# Patient Record
Sex: FEMALE | Race: WHITE | NOT HISPANIC OR LATINO | Employment: OTHER
[De-identification: names, ages, dates, MRNs, and addresses within clinical notes are randomized per-mention and may not be internally consistent; named-entity substitution may affect disease eponyms.]

---

## 2018-07-17 ENCOUNTER — TRANSCRIBE ORDERS (OUTPATIENT)
Dept: SCHEDULING | Age: 58
End: 2018-07-17

## 2018-07-17 DIAGNOSIS — Z12.31 ENCOUNTER FOR SCREENING MAMMOGRAM FOR MALIGNANT NEOPLASM OF BREAST: Primary | ICD-10-CM

## 2018-07-19 ENCOUNTER — HOSPITAL ENCOUNTER (OUTPATIENT)
Dept: RADIOLOGY | Facility: HOSPITAL | Age: 58
Discharge: HOME | End: 2018-07-19
Attending: OBSTETRICS & GYNECOLOGY
Payer: COMMERCIAL

## 2018-07-19 DIAGNOSIS — Z12.31 ENCOUNTER FOR SCREENING MAMMOGRAM FOR MALIGNANT NEOPLASM OF BREAST: ICD-10-CM

## 2018-07-19 PROCEDURE — 77063 BREAST TOMOSYNTHESIS BI: CPT

## 2019-07-18 ENCOUNTER — TRANSCRIBE ORDERS (OUTPATIENT)
Dept: SCHEDULING | Age: 59
End: 2019-07-18

## 2019-07-18 DIAGNOSIS — Z12.31 ENCOUNTER FOR SCREENING MAMMOGRAM FOR MALIGNANT NEOPLASM OF BREAST: Primary | ICD-10-CM

## 2019-07-22 ENCOUNTER — HOSPITAL ENCOUNTER (OUTPATIENT)
Dept: RADIOLOGY | Facility: HOSPITAL | Age: 59
Discharge: HOME | End: 2019-07-22
Attending: OBSTETRICS & GYNECOLOGY
Payer: COMMERCIAL

## 2019-07-22 DIAGNOSIS — Z12.31 ENCOUNTER FOR SCREENING MAMMOGRAM FOR MALIGNANT NEOPLASM OF BREAST: ICD-10-CM

## 2019-07-22 PROCEDURE — 77067 SCR MAMMO BI INCL CAD: CPT

## 2020-03-03 ENCOUNTER — TRANSCRIBE ORDERS (OUTPATIENT)
Dept: SCHEDULING | Age: 60
End: 2020-03-03

## 2020-03-03 DIAGNOSIS — Z13.820 ENCOUNTER FOR SCREENING FOR OSTEOPOROSIS: Primary | ICD-10-CM

## 2020-03-05 ENCOUNTER — HOSPITAL ENCOUNTER (OUTPATIENT)
Dept: RADIOLOGY | Facility: CLINIC | Age: 60
Discharge: HOME | End: 2020-03-05
Attending: FAMILY MEDICINE
Payer: COMMERCIAL

## 2020-03-05 DIAGNOSIS — Z13.820 ENCOUNTER FOR SCREENING FOR OSTEOPOROSIS: ICD-10-CM

## 2020-03-05 PROCEDURE — 77080 DXA BONE DENSITY AXIAL: CPT

## 2020-08-31 ENCOUNTER — TRANSCRIBE ORDERS (OUTPATIENT)
Dept: SCHEDULING | Age: 60
End: 2020-08-31

## 2020-08-31 DIAGNOSIS — Z12.31 ENCOUNTER FOR SCREENING MAMMOGRAM FOR MALIGNANT NEOPLASM OF BREAST: Primary | ICD-10-CM

## 2020-08-31 DIAGNOSIS — Z12.39 ENCOUNTER FOR OTHER SCREENING FOR MALIGNANT NEOPLASM OF BREAST: ICD-10-CM

## 2020-09-01 ENCOUNTER — HOSPITAL ENCOUNTER (OUTPATIENT)
Dept: RADIOLOGY | Facility: HOSPITAL | Age: 60
Discharge: HOME | End: 2020-09-01
Attending: OBSTETRICS & GYNECOLOGY
Payer: COMMERCIAL

## 2020-09-01 DIAGNOSIS — Z12.31 ENCOUNTER FOR SCREENING MAMMOGRAM FOR MALIGNANT NEOPLASM OF BREAST: ICD-10-CM

## 2020-09-01 DIAGNOSIS — Z12.39 ENCOUNTER FOR OTHER SCREENING FOR MALIGNANT NEOPLASM OF BREAST: ICD-10-CM

## 2020-09-01 PROCEDURE — 77063 BREAST TOMOSYNTHESIS BI: CPT

## 2020-10-22 ENCOUNTER — TRANSCRIBE ORDERS (OUTPATIENT)
Dept: SCHEDULING | Age: 60
End: 2020-10-22

## 2020-10-22 DIAGNOSIS — R92.2 INCONCLUSIVE MAMMOGRAM: Primary | ICD-10-CM

## 2020-10-26 ENCOUNTER — HOSPITAL ENCOUNTER (OUTPATIENT)
Dept: RADIOLOGY | Facility: HOSPITAL | Age: 60
Discharge: HOME | End: 2020-10-26
Attending: OBSTETRICS & GYNECOLOGY
Payer: COMMERCIAL

## 2020-10-26 VITALS — WEIGHT: 120 LBS

## 2020-10-26 DIAGNOSIS — R92.2 INCONCLUSIVE MAMMOGRAM: ICD-10-CM

## 2020-10-26 PROCEDURE — 77049 MRI BREAST C-+ W/CAD BI: CPT

## 2020-10-26 PROCEDURE — A9585 GADOBUTROL INJECTION: HCPCS

## 2020-10-26 RX ORDER — GADOBUTROL 604.72 MG/ML
0.1 INJECTION INTRAVENOUS ONCE
Status: COMPLETED | OUTPATIENT
Start: 2020-10-26 | End: 2020-10-26

## 2020-10-26 RX ADMIN — GADOBUTROL 5.4 MMOL: 604.72 INJECTION INTRAVENOUS at 19:00

## 2021-02-12 ENCOUNTER — TRANSCRIBE ORDERS (OUTPATIENT)
Dept: SCHEDULING | Age: 61
End: 2021-02-12

## 2021-02-12 DIAGNOSIS — M20.41 OTHER HAMMER TOE(S) (ACQUIRED), RIGHT FOOT: Primary | ICD-10-CM

## 2021-02-12 DIAGNOSIS — M77.41 METATARSALGIA, RIGHT FOOT: ICD-10-CM

## 2021-02-13 ENCOUNTER — HOSPITAL ENCOUNTER (OUTPATIENT)
Dept: RADIOLOGY | Facility: HOSPITAL | Age: 61
Discharge: HOME | End: 2021-02-13
Attending: ORTHOPAEDIC SURGERY
Payer: COMMERCIAL

## 2021-02-13 DIAGNOSIS — M20.41 OTHER HAMMER TOE(S) (ACQUIRED), RIGHT FOOT: ICD-10-CM

## 2021-02-13 DIAGNOSIS — M77.41 METATARSALGIA, RIGHT FOOT: ICD-10-CM

## 2021-09-14 ENCOUNTER — TRANSCRIBE ORDERS (OUTPATIENT)
Dept: RADIOLOGY | Facility: HOSPITAL | Age: 61
End: 2021-09-14
Payer: COMMERCIAL

## 2021-09-14 ENCOUNTER — HOSPITAL ENCOUNTER (OUTPATIENT)
Dept: RADIOLOGY | Facility: HOSPITAL | Age: 61
Discharge: HOME | End: 2021-09-14
Attending: FAMILY MEDICINE
Payer: COMMERCIAL

## 2021-09-14 DIAGNOSIS — Z12.31 ENCOUNTER FOR SCREENING MAMMOGRAM FOR MALIGNANT NEOPLASM OF BREAST: ICD-10-CM

## 2021-09-14 DIAGNOSIS — Z12.31 ENCOUNTER FOR SCREENING MAMMOGRAM FOR MALIGNANT NEOPLASM OF BREAST: Primary | ICD-10-CM

## 2021-09-14 PROCEDURE — 77067 SCR MAMMO BI INCL CAD: CPT

## 2022-06-29 ENCOUNTER — HOSPITAL ENCOUNTER (EMERGENCY)
Facility: HOSPITAL | Age: 62
Discharge: HOME | End: 2022-06-29
Attending: EMERGENCY MEDICINE
Payer: COMMERCIAL

## 2022-06-29 VITALS
OXYGEN SATURATION: 100 % | RESPIRATION RATE: 18 BRPM | BODY MASS INDEX: 22.5 KG/M2 | HEIGHT: 62 IN | HEART RATE: 54 BPM | DIASTOLIC BLOOD PRESSURE: 77 MMHG | TEMPERATURE: 97.6 F | WEIGHT: 122.3 LBS | SYSTOLIC BLOOD PRESSURE: 144 MMHG

## 2022-06-29 DIAGNOSIS — T63.441A BEE STING, ACCIDENTAL OR UNINTENTIONAL, INITIAL ENCOUNTER: Primary | ICD-10-CM

## 2022-06-29 PROCEDURE — 63700000 HC SELF-ADMINISTRABLE DRUG: Performed by: PHYSICIAN ASSISTANT

## 2022-06-29 PROCEDURE — 99283 EMERGENCY DEPT VISIT LOW MDM: CPT

## 2022-06-29 RX ORDER — FAMOTIDINE 20 MG/1
20 TABLET, FILM COATED ORAL ONCE
Status: COMPLETED | OUTPATIENT
Start: 2022-06-29 | End: 2022-06-29

## 2022-06-29 RX ORDER — EPINEPHRINE 0.3 MG/.3ML
1 INJECTION SUBCUTANEOUS AS NEEDED
Qty: 1 EACH | Refills: 0 | Status: SHIPPED | OUTPATIENT
Start: 2022-06-29 | End: 2022-07-29

## 2022-06-29 RX ADMIN — FAMOTIDINE 20 MG: 20 TABLET, FILM COATED ORAL at 09:40

## 2022-06-29 ASSESSMENT — ENCOUNTER SYMPTOMS
FACIAL SWELLING: 1
ARTHRALGIAS: 0
NAUSEA: 0
PALPITATIONS: 0
CHEST TIGHTNESS: 0
STRIDOR: 0
COUGH: 0
COLOR CHANGE: 0
SORE THROAT: 0
DIARRHEA: 0
TROUBLE SWALLOWING: 0
NUMBNESS: 0
VOMITING: 0
DIZZINESS: 0
ABDOMINAL PAIN: 0
WHEEZING: 0
EYE PAIN: 0
BACK PAIN: 0
SHORTNESS OF BREATH: 0
FEVER: 0
CHILLS: 0

## 2022-06-29 NOTE — ED PROVIDER NOTES
Emergency Medicine Note  HPI   HISTORY OF PRESENT ILLNESS     60 y/o female with no significant PMHx presents s/p bee sting around 8am this morning. States she was stung 2x on her L leg. States she was last stung about 10 years ago and has a known allergy where her face swells up. States she took 2 benadryl at home PTA. She denies tongue/lip/throat edema, SOB/difficulty breathing, hives, nausea, vomiting, diarrhea. States upon arrival to the ED she had some mild swelling to her R eye. States her epi-pens at home are .            Patient History   PAST HISTORY     Reviewed from Nursing Triage:         No past medical history on file.    No past surgical history on file.    No family history on file.           Review of Systems   REVIEW OF SYSTEMS     Review of Systems   Constitutional: Negative for chills and fever.   HENT: Positive for facial swelling. Negative for ear pain, sore throat and trouble swallowing.    Eyes: Negative for pain and visual disturbance.   Respiratory: Negative for cough, chest tightness, shortness of breath, wheezing and stridor.    Cardiovascular: Negative for chest pain and palpitations.   Gastrointestinal: Negative for abdominal pain, diarrhea, nausea and vomiting.   Musculoskeletal: Negative for arthralgias and back pain.   Skin: Negative for color change and rash.   Neurological: Negative for dizziness, syncope and numbness.   All other systems reviewed and are negative.        VITALS     ED Vitals    Date/Time Temp Pulse Resp BP SpO2 [facility]   22 0906 36.4 °C (97.6 °F) 74 20 146/65 100 % KD        Pulse Ox %: 100 % (22 1114)  Pulse Ox Interpretation: Normal (22 1114)           Physical Exam   PHYSICAL EXAM     Physical Exam  Vitals and nursing note reviewed.   Constitutional:       General: She is not in acute distress.     Appearance: She is well-developed.   HENT:      Head: Normocephalic and atraumatic.      Mouth/Throat:      Comments: No oropharyngeal  edema.  Eyes:      Conjunctiva/sclera: Conjunctivae normal.      Comments: Very minimal edema below R eye. No erythema. No other facial edema.   Cardiovascular:      Rate and Rhythm: Normal rate and regular rhythm.      Heart sounds: No murmur heard.  Pulmonary:      Effort: Pulmonary effort is normal. No respiratory distress.      Breath sounds: Normal breath sounds.   Abdominal:      Palpations: Abdomen is soft.      Tenderness: There is no abdominal tenderness.   Musculoskeletal:         General: No swelling.      Cervical back: Neck supple.   Skin:     General: Skin is warm and dry.      Findings: No lesion or rash.   Neurological:      Mental Status: She is alert.           PROCEDURES     Procedures     DATA     Results     None          Imaging Results    None         No orders to display       Scoring tools                                 ED Course & MDM   MDM / ED COURSE / CLINICAL IMPRESSIONS / DISPO     MDM  Number of Diagnoses or Management Options  Diagnosis management comments: A/P: s/p bee sting. Took 2 benadryl PTA. Given pepcid in ED and monitored. Discussed option of steroids and patient declined, opted for monitoring in ED. Epi-pens sent to pharmacy. Return precautions discussed.      ED Course as of 06/29/22 1118 Wed Jun 29, 2022   1113 Patient feels well, no swelling on exam, no respiratory distress. Return precautions discussed. [CP]      ED Course User Index  [CP] Laurie Griffin PA C         Clinical Impressions as of 06/29/22 1118   Bee sting, accidental or unintentional, initial encounter     Discharge         Laurie Griffin PA C  06/29/22 1118

## 2022-06-29 NOTE — ED ATTESTATION NOTE
The patient was evaluated and managed by the physician assistant / nurse practitioner.     Christopher Hartman MD  06/29/22 8025

## 2022-06-29 NOTE — ED ATTESTATION NOTE
The patient was evaluated and managed by the physician assistant / nurse practitioner.     Christopher Hartman MD  07/05/22 9746

## 2022-09-20 NOTE — PROGRESS NOTES
Cardiology  Office Consult Note         Reason for visit:   Chief Complaint   Patient presents with    Cardiac Evaulation     New Pt       HPI     Soraya Carmen is a 62 y.o. female who presents to the office for cardiovascular evaluation. She was referred to the office by PCP for palpitations. EKG at this visit was SR without significant abnormalities.     She has a PMH of elevated blood pressure without diagnosis of hypertension, mixed hyperlipidemia, and factor V Leiden mutation. She has a family history of HTN, HLD in her mother and HTN, HLD, stroke and factor V in her father.     Labs from [facility] 3/4/22 FLP: , TG 44, , .    Today she reports having palpitations several times a week. 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 She occasional has these palpations at night when trying to go to sleep. They last a few seconds and then resolve.     She notes one episode of lightheadedness but relates this to being dehydration and the humidity. She has not had any recurrent episodes since August. She reports that she walks/runs or riding her stationary bike 3-4 miles a couple times a week. She also goes to yoga and aerobics class 2 times a week. She denies symptoms on exertion. She denies chest pain, edema, shortness of breath, dizziness, edema or near syncopal episodes.     Outside records reviewed. Pertinent lab results reviewed..    Past Medical History:   Diagnosis Date    Adjustment insomnia     Anxiety in acute stress reaction     COVID-19     DDD (degenerative disc disease), lumbosacral     Elevated blood pressure reading without diagnosis of hypertension     Factor V Leiden mutation (CMS/[facility])     Female stress incontinence     Hypercholesterolemia     Hyperglycemia     Intermittent palpitations     Mixed hyperlipidemia     Chen's neuroma     Nephrolithiasis     Vitamin D deficiency        Past Surgical History:    Procedure Laterality Date    BUNIONECTOMY       SECTION      x2        Social History     Tobacco Use   Smoking Status Never Smoker   Smokeless Tobacco Never Used     Social History     Tobacco Use    Smoking status: Never Smoker    Smokeless tobacco: Never Used   Vaping Use    Vaping Use: Never used   Substance Use Topics    Alcohol use: Yes     Comment: rarely    Drug use: Never       Family History   Problem Relation Age of Onset    Multiple myeloma Biological Mother     Diabetes Biological Mother     Hypertension Biological Mother     Hyperlipidemia Biological Mother     Pancreatic cancer Biological Father     Hypertension Biological Father     Factor V Leiden deficiency Biological Father     Anxiety disorder Biological Sister     Alcohol abuse Biological Brother     Heart disease Biological Brother     Hyperlipidemia Biological Brother     Drug abuse Biological Brother        Allergies:  Bee sting [bee venom protein (honey bee)]    Current Outpatient Medications   Medication Sig Dispense Refill    cholecalciferol, vitamin D3, 25 mcg (1,000 unit) capsule Take by mouth.      estradioL (ESTRACE) 0.01 % (0.1 mg/gram) vaginal cream APPLY 1 G INTO THE VAGINA 2 TIMES A WEEK.      multivitamin tablet Take by mouth daily.      omega-3s/dha/epa/fish oil/D3 (DE3 DRY EYE OMEGA BENEFITS ORAL) Take by mouth daily.      turmeric root extract 500 mg capsule Take 1 tablet by mouth daily.      zinc sulfate (ZINCATE) 220 (50) mg capsule Take 220 mg by mouth daily.       No current facility-administered medications for this visit.       Review of Systems   Cardiovascular: Positive for palpitations. Negative for chest pain, dyspnea on exertion, leg swelling, near-syncope, orthopnea, paroxysmal nocturnal dyspnea and syncope.   Hematologic/Lymphatic: Does not bruise/bleed easily.   Neurological: Positive for light-headedness. Negative for dizziness.       Objective     Vitals:    22 1318   BP:  130/82   Pulse:    SpO2:        Wt Readings from Last 1 Encounters:   09/26/22 55.3 kg (122 lb)       Body mass index is 22.31 kg/m².    Physical Exam  Vitals reviewed.   Constitutional:       Appearance: Normal appearance.   Cardiovascular:      Rate and Rhythm: Normal rate and regular rhythm.      Pulses: Normal pulses.      Heart sounds: Normal heart sounds.   Pulmonary:      Effort: Pulmonary effort is normal.      Breath sounds: Normal breath sounds.   Musculoskeletal:         General: Normal range of motion.      Cervical back: Normal range of motion.   Skin:     General: Skin is warm and dry.   Neurological:      General: No focal deficit present.      Mental Status: She is alert and oriented to person, place, and time.           ECG: Normal sinus rhythm, essentially normal ECG      Assessment/Plan     Intermittent palpitations  The patient has been having palpitations which appear to be somewhat sporadic and increasing in frequency.  I have asked the patient to wear an event monitor so that we may characterize her rhythm status.  She is agreed and she will return to the office in 2 to 3 weeks at which time we can review the results of her monitor and perform an echocardiogram.  For now, I made no changes to her medical regimen.    Mixed hyperlipidemia  The patient's lipid profile shows a mild/moderate abnormality.  When she returns to the office I will probably discuss coronary calcium scoring with her.  For now, I am focused on her heart rhythm issue.    Elevated BP without diagnosis of hypertension  We will monitor her blood pressure during her cardiac evaluation and can make recommendations at the time of her follow-up visits.    Factor V Leiden mutation (CMS/HCC)  The patient is heterozygous for factor V Leiden gene mutation.  She has never had a clinical event to correlate.    New Medications Ordered This Visit   Medications    estradioL (ESTRACE) 0.01 % (0.1 mg/gram) vaginal cream     Sig: APPLY 1 G  INTO THE VAGINA 2 TIMES A WEEK.       There are no discontinued medications.    Orders Placed This Encounter   Procedures    Mobile cardiac outpatient telemetry     Order Specific Question:   Length of monitoring (days)     Answer:   7     Order Specific Question:   Release to patient     Answer:   Immediate    ECG 12 LEAD-OFFICE PERFORMED     Scheduling Instructions:      PLEASE USE THIS ORDER FOR ECG'S PERFORMED IN PHYSICIAN OFFICES     Order Specific Question:   Release to patient     Answer:   Immediate    Transthoracic echo (TTE) complete     Standing Status:   Future     Standing Expiration Date:   9/26/2023     Scheduling Instructions:      To schedule cardiology appointments with [facility], call Central Scheduling at [phone]. Thank you.     Order Specific Question:   Is Definity and/or agitated saline (bubbles) indicated for the study?     Answer:   No     Order Specific Question:   Release to patient     Answer:   Immediate       Follow Up Plans:  Return in about 2 weeks (around 10/10/2022) for Recheck.          Flower MARCUS, am scribing for, and in the presence of, Danny Hoskins MD.    IDanny MD, personally performed the services described in this documentation as scribed by Flower Hall in my presence, and it is both accurate and complete.     Danny Hoskins MD  9/26/2022

## 2022-09-22 PROBLEM — R00.2 INTERMITTENT PALPITATIONS: Status: ACTIVE | Noted: 2022-09-22

## 2022-09-22 PROBLEM — D68.51 FACTOR V LEIDEN MUTATION (CMS/HCC): Status: ACTIVE | Noted: 2022-09-22

## 2022-09-22 PROBLEM — E78.2 MIXED HYPERLIPIDEMIA: Status: ACTIVE | Noted: 2022-09-22

## 2022-09-22 PROBLEM — E78.00 HYPERCHOLESTEROLEMIA: Status: ACTIVE | Noted: 2022-09-22

## 2022-09-26 ENCOUNTER — OFFICE VISIT (OUTPATIENT)
Dept: CARDIOLOGY | Facility: CLINIC | Age: 62
End: 2022-09-26
Payer: COMMERCIAL

## 2022-09-26 ENCOUNTER — TELEPHONE (OUTPATIENT)
Dept: CARDIOLOGY | Facility: CLINIC | Age: 62
End: 2022-09-26

## 2022-09-26 VITALS
HEART RATE: 66 BPM | OXYGEN SATURATION: 98 % | SYSTOLIC BLOOD PRESSURE: 130 MMHG | BODY MASS INDEX: 22.45 KG/M2 | DIASTOLIC BLOOD PRESSURE: 82 MMHG | HEIGHT: 62 IN | WEIGHT: 122 LBS

## 2022-09-26 DIAGNOSIS — R03.0 ELEVATED BP WITHOUT DIAGNOSIS OF HYPERTENSION: ICD-10-CM

## 2022-09-26 DIAGNOSIS — R00.0 TACHYCARDIA: Primary | ICD-10-CM

## 2022-09-26 DIAGNOSIS — E78.2 MIXED HYPERLIPIDEMIA: ICD-10-CM

## 2022-09-26 DIAGNOSIS — D68.51 FACTOR V LEIDEN MUTATION (CMS/HCC): ICD-10-CM

## 2022-09-26 DIAGNOSIS — R00.2 INTERMITTENT PALPITATIONS: ICD-10-CM

## 2022-09-26 PROCEDURE — 3008F BODY MASS INDEX DOCD: CPT | Performed by: INTERNAL MEDICINE

## 2022-09-26 PROCEDURE — 93000 ELECTROCARDIOGRAM COMPLETE: CPT | Performed by: INTERNAL MEDICINE

## 2022-09-26 PROCEDURE — 99204 OFFICE O/P NEW MOD 45 MIN: CPT | Performed by: INTERNAL MEDICINE

## 2022-09-26 RX ORDER — ESTRADIOL 0.1 MG/G
CREAM VAGINAL
COMMUNITY
Start: 2022-09-07

## 2022-09-26 ASSESSMENT — ENCOUNTER SYMPTOMS
PND: 0
NEAR-SYNCOPE: 0
PALPITATIONS: 1
DIZZINESS: 0
ORTHOPNEA: 0
SYNCOPE: 0
BRUISES/BLEEDS EASILY: 0
LIGHT-HEADEDNESS: 1
DYSPNEA ON EXERTION: 0

## 2022-09-26 NOTE — LETTER
September 26, 2022     Zoe Fernandez DO  [address] [facility] [address]    Patient: Soraya Carmen  YOB: 1960  Date of Visit: 9/26/2022      Dear Dr. Fernandez:    Thank you for referring Soraya Carmen to me for evaluation. Below are my notes for this consultation.    If you have questions, please do not hesitate to call me. I look forward to following your patient along with you.         Sincerely,        Danny Hoskins MD        CC: No Recipients  Danny Hoskins MD  9/26/2022  2:02 PM  Signed       Cardiology  Office Consult Note         Reason for visit:   Chief Complaint   Patient presents with    Cardiac Evaulation     New Pt       HPI     Soraya Carmen is a 62 y.o. female who presents to the office for cardiovascular evaluation. She was referred to the office by PCP for palpitations. EKG at this visit was SR without significant abnormalities.     She has a PMH of elevated blood pressure without diagnosis of hypertension, mixed hyperlipidemia, and factor V Leiden mutation. She has a family history of HTN, HLD in her mother and HTN, HLD, stroke and factor V in her father.     Labs from [facility] 3/4/22 FLP: , TG 44, , .    Today she reports having palpitations several times a week. 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 She occasional has these palpations at night when trying to go to sleep. They last a few seconds and then resolve.     She notes one episode of lightheadedness but relates this to being dehydration and the humidity. She has not had any recurrent episodes since August. She reports that she walks/runs or riding her stationary bike 3-4 miles a couple times a week. She also goes to yoga and aerobics class 2 times a week. She denies symptoms on exertion. She denies chest pain, edema, shortness of breath, dizziness, edema or near syncopal episodes.     Outside records  reviewed. Pertinent lab results reviewed..    Past Medical History:   Diagnosis Date    Adjustment insomnia     Anxiety in acute stress reaction     COVID-19     DDD (degenerative disc disease), lumbosacral     Elevated blood pressure reading without diagnosis of hypertension     Factor V Leiden mutation (CMS/HCC)     Female stress incontinence     Hypercholesterolemia     Hyperglycemia     Intermittent palpitations     Mixed hyperlipidemia     Chen's neuroma     Nephrolithiasis     Vitamin D deficiency        Past Surgical History:   Procedure Laterality Date    BUNIONECTOMY       SECTION      x2        Social History     Tobacco Use   Smoking Status Never Smoker   Smokeless Tobacco Never Used     Social History     Tobacco Use    Smoking status: Never Smoker    Smokeless tobacco: Never Used   Vaping Use    Vaping Use: Never used   Substance Use Topics    Alcohol use: Yes     Comment: rarely    Drug use: Never       Family History   Problem Relation Age of Onset    Multiple myeloma Biological Mother     Diabetes Biological Mother     Hypertension Biological Mother     Hyperlipidemia Biological Mother     Pancreatic cancer Biological Father     Hypertension Biological Father     Factor V Leiden deficiency Biological Father     Anxiety disorder Biological Sister     Alcohol abuse Biological Brother     Heart disease Biological Brother     Hyperlipidemia Biological Brother     Drug abuse Biological Brother        Allergies:  Bee sting [bee venom protein (honey bee)]    Current Outpatient Medications   Medication Sig Dispense Refill    cholecalciferol, vitamin D3, 25 mcg (1,000 unit) capsule Take by mouth.      estradioL (ESTRACE) 0.01 % (0.1 mg/gram) vaginal cream APPLY 1 G INTO THE VAGINA 2 TIMES A WEEK.      multivitamin tablet Take by mouth daily.      omega-3s/dha/epa/fish oil/D3 (DE3 DRY EYE OMEGA BENEFITS ORAL) Take by mouth daily.      turmeric root extract 500 mg  capsule Take 1 tablet by mouth daily.      zinc sulfate (ZINCATE) 220 (50) mg capsule Take 220 mg by mouth daily.       No current facility-administered medications for this visit.       Review of Systems   Cardiovascular: Positive for palpitations. Negative for chest pain, dyspnea on exertion, leg swelling, near-syncope, orthopnea, paroxysmal nocturnal dyspnea and syncope.   Hematologic/Lymphatic: Does not bruise/bleed easily.   Neurological: Positive for light-headedness. Negative for dizziness.       Objective     Vitals:    09/26/22 1318   BP: 130/82   Pulse:    SpO2:        Wt Readings from Last 1 Encounters:   09/26/22 55.3 kg (122 lb)       Body mass index is 22.31 kg/m².    Physical Exam  Vitals reviewed.   Constitutional:       Appearance: Normal appearance.   Cardiovascular:      Rate and Rhythm: Normal rate and regular rhythm.      Pulses: Normal pulses.      Heart sounds: Normal heart sounds.   Pulmonary:      Effort: Pulmonary effort is normal.      Breath sounds: Normal breath sounds.   Musculoskeletal:         General: Normal range of motion.      Cervical back: Normal range of motion.   Skin:     General: Skin is warm and dry.   Neurological:      General: No focal deficit present.      Mental Status: She is alert and oriented to person, place, and time.           ECG: Normal sinus rhythm, essentially normal ECG      Assessment/Plan     Intermittent palpitations  The patient has been having palpitations which appear to be somewhat sporadic and increasing in frequency.  I have asked the patient to wear an event monitor so that we may characterize her rhythm status.  She is agreed and she will return to the office in 2 to 3 weeks at which time we can review the results of her monitor and perform an echocardiogram.  For now, I made no changes to her medical regimen.    Mixed hyperlipidemia  The patient's lipid profile shows a mild/moderate abnormality.  When she returns to the office I will probably  discuss coronary calcium scoring with her.  For now, I am focused on her heart rhythm issue.    Elevated BP without diagnosis of hypertension  We will monitor her blood pressure during her cardiac evaluation and can make recommendations at the time of her follow-up visits.    Factor V Leiden mutation (CMS/HCC)  The patient is heterozygous for factor V Leiden gene mutation.  She has never had a clinical event to correlate.    New Medications Ordered This Visit   Medications    estradioL (ESTRACE) 0.01 % (0.1 mg/gram) vaginal cream     Sig: APPLY 1 G INTO THE VAGINA 2 TIMES A WEEK.       There are no discontinued medications.    Orders Placed This Encounter   Procedures    Mobile cardiac outpatient telemetry     Order Specific Question:   Length of monitoring (days)     Answer:   7     Order Specific Question:   Release to patient     Answer:   Immediate    ECG 12 LEAD-OFFICE PERFORMED     Scheduling Instructions:      PLEASE USE THIS ORDER FOR ECG'S PERFORMED IN PHYSICIAN OFFICES     Order Specific Question:   Release to patient     Answer:   Immediate    Transthoracic echo (TTE) complete     Standing Status:   Future     Standing Expiration Date:   9/26/2023     Scheduling Instructions:      To schedule cardiology appointments with [facility], call Central Scheduling at [phone]. Thank you.     Order Specific Question:   Is Definity and/or agitated saline (bubbles) indicated for the study?     Answer:   No     Order Specific Question:   Release to patient     Answer:   Immediate       Follow Up Plans:  Return in about 2 weeks (around 10/10/2022) for Recheck.          IFlower, am scribing for, and in the presence of, Danny Hoskins MD.    Danny MARCUS MD, personally performed the services described in this documentation as scribed by Flower Hall in my presence, and it is both accurate and complete.     Danny Hoskins MD  9/26/2022

## 2022-09-26 NOTE — ASSESSMENT & PLAN NOTE
The patient is heterozygous for factor V Leiden gene mutation.  She has never had a clinical event to correlate.   no 97.8

## 2022-09-26 NOTE — ASSESSMENT & PLAN NOTE
We will monitor her blood pressure during her cardiac evaluation and can make recommendations at the time of her follow-up visits.

## 2022-09-26 NOTE — TELEPHONE ENCOUNTER
The patient is being ordered a Transthoracic echo (TTE) complete to be done at the [facility] office on 10/12/2022.  She has Personal choice for insurance.  Can you please try and get this approved?  Thank you      [facility] PERSONAL CHOICE  [id number] [facility]       Primary Visit Coverage Subscriber    ID Name SSN Address   [id number]

## 2022-09-26 NOTE — ASSESSMENT & PLAN NOTE
The patient's lipid profile shows a mild/moderate abnormality.  When she returns to the office I will probably discuss coronary calcium scoring with her.  For now, I am focused on her heart rhythm issue.

## 2022-10-04 ENCOUNTER — TRANSCRIBE ORDERS (OUTPATIENT)
Dept: REGISTRATION | Age: 62
End: 2022-10-04

## 2022-10-04 ENCOUNTER — HOSPITAL ENCOUNTER (OUTPATIENT)
Dept: RADIOLOGY | Age: 62
Discharge: HOME | End: 2022-10-04
Attending: FAMILY MEDICINE
Payer: COMMERCIAL

## 2022-10-04 DIAGNOSIS — Z12.31 ENCOUNTER FOR SCREENING MAMMOGRAM FOR MALIGNANT NEOPLASM OF BREAST: ICD-10-CM

## 2022-10-04 DIAGNOSIS — Z12.31 ENCOUNTER FOR SCREENING MAMMOGRAM FOR MALIGNANT NEOPLASM OF BREAST: Primary | ICD-10-CM

## 2022-10-04 PROCEDURE — 77063 BREAST TOMOSYNTHESIS BI: CPT

## 2022-10-10 ENCOUNTER — TELEPHONE (OUTPATIENT)
Dept: CARDIOLOGY | Facility: CLINIC | Age: 62
End: 2022-10-10
Payer: COMMERCIAL

## 2022-10-10 NOTE — TELEPHONE ENCOUNTER
Patient stated that she does not want to wear the MCOT she wants to mail it back today. She stated that it is to big and bulky for her and she is to active to wear it     She stated that she wants to wear the patch and would like to know where she can get one    Please advise

## 2022-10-11 NOTE — TELEPHONE ENCOUNTER
Patient stated that she will see you on Wednesday for her appt and will discuss her options with you then

## 2022-10-12 ENCOUNTER — HOSPITAL ENCOUNTER (OUTPATIENT)
Dept: CARDIOLOGY | Facility: CLINIC | Age: 62
Discharge: HOME | End: 2022-10-12
Attending: INTERNAL MEDICINE
Payer: COMMERCIAL

## 2022-10-12 ENCOUNTER — OFFICE VISIT (OUTPATIENT)
Dept: CARDIOLOGY | Facility: CLINIC | Age: 62
End: 2022-10-12
Payer: COMMERCIAL

## 2022-10-12 VITALS
HEIGHT: 62 IN | WEIGHT: 122 LBS | DIASTOLIC BLOOD PRESSURE: 82 MMHG | SYSTOLIC BLOOD PRESSURE: 130 MMHG | BODY MASS INDEX: 22.45 KG/M2

## 2022-10-12 VITALS
WEIGHT: 124 LBS | HEART RATE: 70 BPM | OXYGEN SATURATION: 98 % | HEIGHT: 62 IN | BODY MASS INDEX: 22.82 KG/M2 | DIASTOLIC BLOOD PRESSURE: 80 MMHG | SYSTOLIC BLOOD PRESSURE: 112 MMHG

## 2022-10-12 DIAGNOSIS — R03.0 ELEVATED BP WITHOUT DIAGNOSIS OF HYPERTENSION: ICD-10-CM

## 2022-10-12 DIAGNOSIS — D68.51 FACTOR V LEIDEN MUTATION (CMS/HCC): ICD-10-CM

## 2022-10-12 DIAGNOSIS — E78.2 MIXED HYPERLIPIDEMIA: ICD-10-CM

## 2022-10-12 DIAGNOSIS — R00.2 INTERMITTENT PALPITATIONS: ICD-10-CM

## 2022-10-12 DIAGNOSIS — R00.2 INTERMITTENT PALPITATIONS: Primary | ICD-10-CM

## 2022-10-12 LAB
AORTIC ROOT ANNULUS: 2.5 CM
AORTIC VALVE MEAN VELOCITY: 1.1 M/S
AORTIC VALVE VELOCITY TIME INTEGRAL: 32.2 CM
ASCENDING AORTA: 2.7 CM
AV MEAN GRADIENT: 5 MMHG
AV PEAK GRADIENT: 10 MMHG
AV PEAK VELOCITY-S: 1.58 M/S
AV VALVE AREA: 2.03 CM2
BSA FOR ECHO PROCEDURE: 1.56 M2
CUSP SEPARATION: 2.2 CM
DOP CALC LVOT STROKE VOLUME: 65.37 ML
E WAVE DECELERATION TIME: 204 MS
E/A RATIO: 0.7
E/E' RATIO: 10
E/LAT E' RATIO: 7.1
EDV (BP): 56.3 CM3
EF (A4C): 65.7 %
EF A2C: 61.6 %
EJECTION FRACTION: 63.1 %
EST RIGHT VENT SYSTOLIC PRESSURE BY TRICUSPID REGURGITATION JET: 16 MMHG
ESV (BP): 20.8 CM3
FRACTIONAL SHORTENING: 33.3 %
INTERVENTRICULAR SEPTUM: 0.9 CM
LA ESV (BP): 44.6 CM3
LA ESV INDEX (A2C): 22.5 CM3/M2
LA ESV INDEX (BP): 28.59 CM3/M2
LA/AORTA RATIO: 1.2
LAAS-AP2: 13.8 CM2
LAAS-AP4: 18.5 CM2
LAD 2D: 3 CM
LALD A4C: 4.58 CM
LALD A4C: 5.55 CM
LAV-S: 35.1 CM3
LEFT ATRIUM VOLUME INDEX: 30.13 CM3/M2
LEFT ATRIUM VOLUME: 47 CM3
LEFT INTERNAL DIMENSION IN SYSTOLE: 2.6 CM (ref 2.26–3.43)
LEFT VENTRICLE DIASTOLIC VOLUME INDEX: 46.35 CM3/M2
LEFT VENTRICLE DIASTOLIC VOLUME: 72.3 CM3
LEFT VENTRICLE SYSTOLIC VOLUME INDEX: 15.9 CM3/M2
LEFT VENTRICLE SYSTOLIC VOLUME: 24.8 CM3
LEFT VENTRICULAR INTERNAL DIMENSION IN DIASTOLE: 3.9 CM (ref 3.81–5.29)
LEFT VENTRICULAR POSTERIOR WALL IN END DIASTOLE: 0.9 CM (ref 0.49–0.91)
LV DIASTOLIC VOLUME: 43.9 CM3
LV ESV (APICAL 2 CHAMBER): 16.9 CM3
LVAD-AP2: 20.7 CM2
LVAD-AP4: 26.7 CM2
LVAS-AP2: 11.4 CM2
LVAS-AP4: 14.2 CM2
LVEDVI(A2C): 28.14 CM3/M2
LVEDVI(BP): 36.09 CM3/M2
LVESVI(A2C): 10.83 CM3/M2
LVESVI(BP): 13.33 CM3/M2
LVLD-AP2: 8.2 CM
LVLD-AP4: 8.15 CM
LVLS-AP2: 6.65 CM
LVLS-AP4: 6.89 CM
LVOT 2D: 1.8 CM
LVOT A: 2.54 CM2
LVOT MG: 4 MMHG
LVOT MV: 0.86 M/S
LVOT PEAK VELOCITY: 1.31 M/S
LVOT VTI: 25.7 CM
MV E'TISSUE VEL-LAT: 0.09 M/S
MV E'TISSUE VEL-MED: 0.06 M/S
MV PEAK A VEL: 0.88 M/S
MV PEAK E VEL: 0.61 M/S
POSTERIOR WALL: 0.9 CM
PV PEAK GRADIENT: 4 MMHG
PV PV: 1.05 M/S
RAP: 5 MMHG
RVOT VMAX: 0.78 M/S
RVOT VTI: 16.4 CM
SEPTAL TISSUE DOPPLER FREE WALL LATE DIA VELOCITY (APICAL 4 CHAMBER VIEW): 0.14 M/S
TR MAX PG: 11 MMHG
TRICUSPID VALVE PEAK REGURGITATION VELOCITY: 1.66 M/S
Z-SCORE OF LEFT VENTRICULAR DIMENSION IN END DIASTOLE: -1.4
Z-SCORE OF LEFT VENTRICULAR DIMENSION IN END SYSTOLE: -0.54
Z-SCORE OF LEFT VENTRICULAR POSTERIOR WALL IN END DIASTOLE: 1.58

## 2022-10-12 PROCEDURE — 99214 OFFICE O/P EST MOD 30 MIN: CPT | Performed by: INTERNAL MEDICINE

## 2022-10-12 PROCEDURE — 3008F BODY MASS INDEX DOCD: CPT | Performed by: INTERNAL MEDICINE

## 2022-10-12 PROCEDURE — 93000 ELECTROCARDIOGRAM COMPLETE: CPT | Performed by: INTERNAL MEDICINE

## 2022-10-12 PROCEDURE — 93306 TTE W/DOPPLER COMPLETE: CPT | Performed by: INTERNAL MEDICINE

## 2022-10-12 ASSESSMENT — ENCOUNTER SYMPTOMS
SYNCOPE: 0
PALPITATIONS: 1
BRUISES/BLEEDS EASILY: 0
DIZZINESS: 0
NEAR-SYNCOPE: 0
LIGHT-HEADEDNESS: 1
ORTHOPNEA: 0
IRREGULAR HEARTBEAT: 1
DYSPNEA ON EXERTION: 0

## 2022-10-12 NOTE — ASSESSMENT & PLAN NOTE
Today's blood pressure is normal.  She requires no medications at this time for her blood pressure.  She should have periodic repeat blood pressure checks.

## 2022-10-12 NOTE — ASSESSMENT & PLAN NOTE
The patient is heterozygous for factor V Leiden gene mutation.  She has had no clinical events related to this diagnosis.

## 2022-10-12 NOTE — ASSESSMENT & PLAN NOTE
The patient will again attempt to wear her outpatient heart rhythm monitor.  She had an echocardiogram performed in the office today, which was entirely normal.  Her resting ECG is also normal.  We will wait for the results of her outpatient heart rhythm recordings prior to making any further recommendations.

## 2022-10-12 NOTE — PROGRESS NOTES
Cardiology  Office Progress Note         Reason for visit:   Chief Complaint   Patient presents with    Follow-up       HPI     Soraya Carmen is a 62 y.o. female who presents to the office for cardiovascular follow up and management of elevated blood pressure and dyslipidemia.  She also has a history of factor V Leiden mutation.    She was seen in the office on 22 for complaint of palpitation several times a week. At that visit I ordered a 1 week MCOT and asked her to return in 4 weeks to review her results and for an echocardiogram    She phoned the office on 10/10/22 stating she did not wear the monitor as she felt it was too big and bulky for her and requested a patch monitor.  I informed her at that time that unfortunately, [employer] does not supply us with patch rhythm recorders.    She had her echo in the office today.  She also reports that she is willing to try wearing the MCOT.    She contines to have palpitations almost daily. She describes these as a flip flop or skipped beats that lasts only seconds but can occur several times per day.    She denies any chest pain, shortness of breath, edema, near syncope or syncope.      Past Medical History:   Diagnosis Date    Adjustment insomnia     Anxiety in acute stress reaction     COVID-19     DDD (degenerative disc disease), lumbosacral     Elevated blood pressure reading without diagnosis of hypertension     Factor V Leiden mutation (CMS/[facility])     Female stress incontinence     Hypercholesterolemia     Hyperglycemia     Intermittent palpitations     Mixed hyperlipidemia     Chen's neuroma     Nephrolithiasis     Vitamin D deficiency        Past Surgical History:   Procedure Laterality Date    BUNIONECTOMY       SECTION      x2       Social History     Tobacco Use    Smoking status: Never    Smokeless tobacco: Never   Vaping Use    Vaping Use: Never used   Substance Use Topics    Alcohol use: Yes     Comment:  rarely    Drug use: Never       Family History   Problem Relation Age of Onset    Multiple myeloma Biological Mother     Diabetes Biological Mother     Hypertension Biological Mother     Hyperlipidemia Biological Mother     Pancreatic cancer Biological Father     Hypertension Biological Father     Factor V Leiden deficiency Biological Father     Anxiety disorder Biological Sister     Alcohol abuse Biological Brother     Heart disease Biological Brother     Hyperlipidemia Biological Brother     Drug abuse Biological Brother        Allergies:  Bee sting [bee venom protein (honey bee)]    Current Outpatient Medications   Medication Sig Dispense Refill    cholecalciferol, vitamin D3, 25 mcg (1,000 unit) capsule Take by mouth.      estradioL (ESTRACE) 0.01 % (0.1 mg/gram) vaginal cream APPLY 1 G INTO THE VAGINA 2 TIMES A WEEK.      multivitamin tablet Take by mouth daily.      omega-3s/dha/epa/fish oil/D3 (DE3 DRY EYE OMEGA BENEFITS ORAL) Take by mouth daily.      turmeric root extract 500 mg capsule Take 1 tablet by mouth daily.      zinc sulfate (ZINCATE) 220 (50) mg capsule Take 220 mg by mouth daily.       No current facility-administered medications for this visit.       Review of Systems   Constitutional: Negative for malaise/fatigue.   Cardiovascular: Positive for irregular heartbeat and palpitations. Negative for chest pain, dyspnea on exertion, leg swelling, near-syncope, orthopnea and syncope.   Hematologic/Lymphatic: Does not bruise/bleed easily.   Neurological: Positive for light-headedness (related to low blood sugar). Negative for dizziness.   All other systems reviewed and are negative.      Objective     Vitals:    10/12/22 0951   BP: 112/80   Pulse: 70   SpO2: 98%       Wt Readings from Last 3 Encounters:   10/12/22 56.2 kg (124 lb)   10/12/22 55.3 kg (122 lb)   09/26/22 55.3 kg (122 lb)       Body mass index is 22.68 kg/m².    Physical Exam  Vitals and nursing note reviewed.    Constitutional:       General: She is not in acute distress.     Appearance: Normal appearance.   HENT:      Head: Normocephalic.   Eyes:      Extraocular Movements: Extraocular movements intact.   Cardiovascular:      Rate and Rhythm: Normal rate and regular rhythm.      Pulses: Normal pulses.      Heart sounds: Normal heart sounds.   Pulmonary:      Effort: Pulmonary effort is normal.      Breath sounds: Normal breath sounds.   Abdominal:      General: There is no distension.      Palpations: Abdomen is soft.   Musculoskeletal:         General: Normal range of motion.      Cervical back: Normal range of motion.      Right lower leg: No edema.      Left lower leg: No edema.   Skin:     General: Skin is warm and dry.      Findings: No rash.   Neurological:      General: No focal deficit present.      Mental Status: She is alert and oriented to person, place, and time.   Psychiatric:         Mood and Affect: Mood normal.         Behavior: Behavior normal.         Thought Content: Thought content normal.         Judgment: Judgment normal.          ECG: Normal sinus rhythm, within normal limits          Assessment/Plan     Intermittent palpitations  The patient will again attempt to wear her outpatient heart rhythm monitor.  She had an echocardiogram performed in the office today, which was entirely normal.  Her resting ECG is also normal.  We will wait for the results of her outpatient heart rhythm recordings prior to making any further recommendations.    Elevated BP without diagnosis of hypertension  Today's blood pressure is normal.  She requires no medications at this time for her blood pressure.  She should have periodic repeat blood pressure checks.    Factor V Leiden mutation (CMS/HCC)  The patient is heterozygous for factor V Leiden gene mutation.  She has had no clinical events related to this diagnosis.      No orders of the defined types were placed in this encounter.      There are no discontinued  medications.    Orders Placed This Encounter   Procedures    ECG 12 lead     Scheduling Instructions:      PLEASE USE THIS ORDER FOR ECG'S PERFORMED IN PHYSICIAN OFFICES     Order Specific Question:   Release to patient     Answer:   Immediate       Follow Up Plans:  No follow-ups on file.          I, Mitra Montes, am scribing for, and in the presence of, Danny Hoskins MD.    Danny MARCUS MD, personally performed the services described in this documentation as scribed by Mitra Montes in my presence, and it is both accurate and complete.       Danny Hoskins MD  10/12/2022

## 2022-12-14 ENCOUNTER — TELEPHONE (OUTPATIENT)
Dept: CARDIOLOGY | Facility: CLINIC | Age: 62
End: 2022-12-14
Payer: COMMERCIAL

## 2022-12-14 NOTE — TELEPHONE ENCOUNTER
I called and scheduled patient for a clearance on 1/6/23 at [place].  If any sooner apts are available the patient would like to be seen sooner

## 2022-12-14 NOTE — TELEPHONE ENCOUNTER
Patient is going to have a biopsy of a tumor on her bladder on 1/11/2023 with Dr. Barba at [place]   Can she be cleared for this procedure off of her last visit on 10/12/2022   If so can you please make an addendum Thanks!!  Fax number for Dr. Barba's office is [phone]

## 2022-12-15 ENCOUNTER — OFFICE VISIT (OUTPATIENT)
Dept: CARDIOLOGY | Facility: CLINIC | Age: 62
End: 2022-12-15
Payer: COMMERCIAL

## 2022-12-15 VITALS
OXYGEN SATURATION: 98 % | BODY MASS INDEX: 22.08 KG/M2 | DIASTOLIC BLOOD PRESSURE: 82 MMHG | HEIGHT: 63 IN | SYSTOLIC BLOOD PRESSURE: 130 MMHG | HEART RATE: 72 BPM | WEIGHT: 124.6 LBS

## 2022-12-15 DIAGNOSIS — Z01.810 ENCOUNTER FOR PRE-OPERATIVE CARDIOVASCULAR CLEARANCE: ICD-10-CM

## 2022-12-15 DIAGNOSIS — R00.2 INTERMITTENT PALPITATIONS: ICD-10-CM

## 2022-12-15 DIAGNOSIS — E78.2 MIXED HYPERLIPIDEMIA: ICD-10-CM

## 2022-12-15 DIAGNOSIS — D49.4 BLADDER TUMOR: ICD-10-CM

## 2022-12-15 DIAGNOSIS — Z01.818 PRE-OPERATIVE CLEARANCE: Primary | ICD-10-CM

## 2022-12-15 DIAGNOSIS — R03.0 ELEVATED BP WITHOUT DIAGNOSIS OF HYPERTENSION: ICD-10-CM

## 2022-12-15 PROCEDURE — 99214 OFFICE O/P EST MOD 30 MIN: CPT | Performed by: INTERNAL MEDICINE

## 2022-12-15 PROCEDURE — 3008F BODY MASS INDEX DOCD: CPT | Performed by: INTERNAL MEDICINE

## 2022-12-15 PROCEDURE — 93000 ELECTROCARDIOGRAM COMPLETE: CPT | Performed by: INTERNAL MEDICINE

## 2022-12-15 ASSESSMENT — ENCOUNTER SYMPTOMS
PALPITATIONS: 1
LIGHT-HEADEDNESS: 0
DIZZINESS: 0
DYSPNEA ON EXERTION: 0

## 2022-12-15 NOTE — ASSESSMENT & PLAN NOTE
There have been no recent episodes of angina, CHF, or clinical/symptomatic arrhythmias. The overall risk is low and acceptable. Further testing or interventions are unlikely to improve the patient's risk. Routine perioperative care. DVT prophylaxis according to the surgical service's protocol.

## 2022-12-15 NOTE — ASSESSMENT & PLAN NOTE
The patient is scheduled for cystoscopy at [facility] on January 11, 2023.  She had an incidentally discovered small tumor during vaginal ultrasound examination.  She has had no symptoms or hematuria.

## 2022-12-15 NOTE — ASSESSMENT & PLAN NOTE
The patient has a mildly abnormal lipid profile.  She is attempting to adhere to lifestyle modification as therapy.  She should have her lipids periodically reexamined.

## 2022-12-15 NOTE — ASSESSMENT & PLAN NOTE
The patient is heterozygous for factor V Leiden gene mutation.  She has had no clinical events related to this diagnosis.  No further evaluation is indicated at this time.

## 2022-12-15 NOTE — ASSESSMENT & PLAN NOTE
The patient has occasional palpitations which she believes are stress related.  Her resting ECG as well as an echocardiogram were both normal.  She is not had lightheadedness, syncope, or near syncope associated with palpitations.  She is also not had chest discomfort or other cardiac symptoms.  To this point, no arrhythmias been documented.  We will continue to follow her clinically.  She did unsuccessfully attempt to wear an outpatient heart rhythm monitor.

## 2022-12-15 NOTE — PROGRESS NOTES
Pre-Operative   Consult Note         Reason for visit:   Chief Complaint   Patient presents with   • Pre-op Exam       HPI     Soraya Carmen comes to the office today for preoperative cardiac evaluation prior to a cystourethoscopy with DEST and/or tumor removal with Dr. Barba on 23 at [place]. She had a pelvic US that showed a bladder mass.     She was last seen in the office on 10/12/22 for management of elevated blood pressure and dyslipidemia. She as having palpitaitons almost daily. She was to wear an MCOT.     She continues to have palpitations. This remains unchanged. She denies having any chest pain, shortness of breath, edema or lightheadedness. She exercises on a regular basis and denies any symptoms with this.     She reports that she can climb a flight of stairs and walk at least a city block in distance without chest discomfort or shortness of breath.    She denies any chest pain, palpitations, shortness of breath, PND, light-headedness, syncope or stroke-like symptoms.     Outside records reviewed.    Past Medical History:   Diagnosis Date   • Adjustment insomnia    • Anxiety in acute stress reaction    • COVID-19    • DDD (degenerative disc disease), lumbosacral    • Elevated blood pressure reading without diagnosis of hypertension    • Factor V Leiden mutation (CMS/HCC)    • Female stress incontinence    • Hypercholesterolemia    • Hyperglycemia    • Intermittent palpitations    • Mixed hyperlipidemia    • Chen's neuroma    • Nephrolithiasis    • Vitamin D deficiency        Past Surgical History:   Procedure Laterality Date   • BUNIONECTOMY     •  SECTION      x2       Social History     Tobacco Use   Smoking Status Never   Smokeless Tobacco Never     Social History     Tobacco Use   • Smoking status: Never   • Smokeless tobacco: Never   Vaping Use   • Vaping Use: Never used   Substance Use Topics   • Alcohol use: Yes     Comment: rarely   • Drug use: Never       Family History    Problem Relation Age of Onset   • Multiple myeloma Biological Mother    • Diabetes Biological Mother    • Hypertension Biological Mother    • Hyperlipidemia Biological Mother    • Pancreatic cancer Biological Father    • Hypertension Biological Father    • Factor V Leiden deficiency Biological Father    • Anxiety disorder Biological Sister    • Alcohol abuse Biological Brother    • Heart disease Biological Brother    • Hyperlipidemia Biological Brother    • Drug abuse Biological Brother        Allergies:  Bee sting [bee venom protein (honey bee)]    Current Outpatient Medications   Medication Sig Dispense Refill   • cholecalciferol, vitamin D3, 25 mcg (1,000 unit) capsule Take by mouth.     • estradioL (ESTRACE) 0.01 % (0.1 mg/gram) vaginal cream APPLY 1 G INTO THE VAGINA 2 TIMES A WEEK.     • multivitamin tablet Take by mouth daily.     • omega-3s/dha/epa/fish oil/D3 (DE3 DRY EYE OMEGA BENEFITS ORAL) Take by mouth daily.     • turmeric root extract 500 mg capsule Take 1 tablet by mouth daily.     • zinc sulfate (ZINCATE) 220 (50) mg capsule Take 220 mg by mouth daily.       No current facility-administered medications for this visit.       Review of Systems   Cardiovascular: Positive for palpitations. Negative for chest pain, dyspnea on exertion and leg swelling.   Neurological: Negative for dizziness and light-headedness.       Objective     Vitals:    12/15/22 0810   BP: 130/82   Pulse: 72   SpO2: 98%       Wt Readings from Last 1 Encounters:   12/15/22 56.5 kg (124 lb 9.6 oz)       Physical Exam  Vitals and nursing note reviewed.   Constitutional:       General: She is not in acute distress.     Appearance: Normal appearance.   HENT:      Head: Normocephalic.   Eyes:      Extraocular Movements: Extraocular movements intact.   Cardiovascular:      Rate and Rhythm: Normal rate and regular rhythm.      Pulses: Normal pulses.      Heart sounds: Normal heart sounds.   Pulmonary:      Effort: Pulmonary effort is  normal.      Breath sounds: Normal breath sounds.   Abdominal:      General: There is no distension.      Palpations: Abdomen is soft.   Musculoskeletal:         General: Normal range of motion.      Cervical back: Normal range of motion.      Right lower leg: No edema.      Left lower leg: No edema.   Skin:     General: Skin is warm and dry.      Findings: No rash.   Neurological:      General: No focal deficit present.      Mental Status: She is alert and oriented to person, place, and time.   Psychiatric:         Mood and Affect: Mood normal.         Behavior: Behavior normal.         Thought Content: Thought content normal.         Judgment: Judgment normal.          ECG: Normal sinus rhythm, within normal limits    Echocardiography 10/12/2022:    • Normal-sized LV. Normal LV systolic function. Estimated EF 60- 65%. Normal LV septal wall motion. No regional wall motion abnormalities. Normal LV wall thickness. Normal diastolic filling pattern for age of the LV.  • Normal-sized RV. Normal RV systolic function.  • Normal-sized LA.  • Normal-sized RA.  • Aortic root normal. Sinuses of Valsalva normal-sized. Ascending aorta normal-sized. Aortic arch normal-sized. Descending aorta normal-sized.  • Aortic valve structure is normal. No aortic valve regurgitation. No aortic valve stenosis.  • Normal leaflet structure and normal leaflet motion of the mitral valve.  • Trace mitral valve regurgitation.  • No significant mitral valve stenosis.  • Tricuspid valve structure is grossly normal. Mild tricuspid valve regurgitation.  • Estimated RVSP = 16 mmHg.  • No significant tricuspid valve stenosis.  • Grossly normal pulmonic valve structure. Trace pulmonic valve regurgitation. No pulmonic valve stenosis.  • Normal-sized IVC. IVC demonstrates normal respiratory collapse.  • Normal pericardial structure. No evidence of pericardial effusion.             Surgical Risk Assessment  The proposed procedure is low risk, corresponding  to a < 1% 30-day risk of a major adverse cardiac event.    The patient has the following risk factors from the Revised Cardiac Risk Index (RCRI): None.  With no risk factors, this corresponds to a low risk (0.4%) of major adverse cardiac events.    The patient has a Duke Activity Status Index score of 52.95, corresponding to an expected exertional capacity of 9.25 METS.          Assessment/Plan     Bladder tumor  The patient is scheduled for cystoscopy at [facility] on January 11, 2023.  She had an incidentally discovered small tumor during vaginal ultrasound examination.  She has had no symptoms or hematuria.    Encounter for pre-operative cardiovascular clearance  There have been no recent episodes of angina, CHF, or clinical/symptomatic arrhythmias. The overall risk is low and acceptable. Further testing or interventions are unlikely to improve the patient's risk. Routine perioperative care. DVT prophylaxis according to the surgical service's protocol.      Mixed hyperlipidemia  The patient has a mildly abnormal lipid profile.  She is attempting to adhere to lifestyle modification as therapy.  She should have her lipids periodically reexamined.    Intermittent palpitations  The patient has occasional palpitations which she believes are stress related.  Her resting ECG as well as an echocardiogram were both normal.  She is not had lightheadedness, syncope, or near syncope associated with palpitations.  She is also not had chest discomfort or other cardiac symptoms.  To this point, no arrhythmias been documented.  We will continue to follow her clinically.  She did unsuccessfully attempt to wear an outpatient heart rhythm monitor.    Factor V Leiden mutation (CMS/[facility])  The patient is heterozygous for factor V Leiden gene mutation.  She has had no clinical events related to this diagnosis.  No further evaluation is indicated at this time.    Elevated BP without diagnosis of hypertension  The patient  has had occasional mild blood pressure elevations, but she is generally normotensive.  She is not currently on therapy for this problem.  We are following her clinically.           I, Kamila Webb , am scribing for, and in the presence of, Danny Hoskins MD.    IDanny MD, personally performed the services described in this documentation as scribed by Kamila Webb  in my presence, and it is both accurate and complete.       Danny Hoskins MD  12/15/2022

## 2022-12-15 NOTE — ASSESSMENT & PLAN NOTE
The patient has had occasional mild blood pressure elevations, but she is generally normotensive.  She is not currently on therapy for this problem.  We are following her clinically.

## 2023-01-17 ENCOUNTER — TRANSCRIBE ORDERS (OUTPATIENT)
Dept: SCHEDULING | Age: 63
End: 2023-01-17

## 2023-01-17 ENCOUNTER — APPOINTMENT (EMERGENCY)
Dept: RADIOLOGY | Facility: HOSPITAL | Age: 63
End: 2023-01-17
Payer: COMMERCIAL

## 2023-01-17 ENCOUNTER — HOSPITAL ENCOUNTER (OUTPATIENT)
Dept: RADIOLOGY | Age: 63
Discharge: HOME | End: 2023-01-17
Attending: FAMILY MEDICINE
Payer: COMMERCIAL

## 2023-01-17 ENCOUNTER — HOSPITAL ENCOUNTER (EMERGENCY)
Facility: HOSPITAL | Age: 63
Discharge: HOME | End: 2023-01-17
Attending: EMERGENCY MEDICINE
Payer: COMMERCIAL

## 2023-01-17 VITALS
RESPIRATION RATE: 18 BRPM | OXYGEN SATURATION: 100 % | SYSTOLIC BLOOD PRESSURE: 170 MMHG | DIASTOLIC BLOOD PRESSURE: 86 MMHG | HEART RATE: 72 BPM | TEMPERATURE: 97.5 F

## 2023-01-17 DIAGNOSIS — M81.0 AGE-RELATED OSTEOPOROSIS WITHOUT CURRENT PATHOLOGICAL FRACTURE: Primary | ICD-10-CM

## 2023-01-17 DIAGNOSIS — M81.0 AGE-RELATED OSTEOPOROSIS WITHOUT CURRENT PATHOLOGICAL FRACTURE: ICD-10-CM

## 2023-01-17 DIAGNOSIS — S09.90XA HEAD INJURY, INITIAL ENCOUNTER: Primary | ICD-10-CM

## 2023-01-17 PROCEDURE — 70450 CT HEAD/BRAIN W/O DYE: CPT

## 2023-01-17 PROCEDURE — 77080 DXA BONE DENSITY AXIAL: CPT

## 2023-01-17 PROCEDURE — 99284 EMERGENCY DEPT VISIT MOD MDM: CPT | Mod: 25

## 2023-01-17 ASSESSMENT — ENCOUNTER SYMPTOMS
WEAKNESS: 0
FEVER: 0
NAUSEA: 0
EYE PAIN: 0
HEADACHES: 1
ABDOMINAL PAIN: 0
VOMITING: 0
DIZZINESS: 0
NECK PAIN: 0
PHOTOPHOBIA: 0
CHILLS: 0
NUMBNESS: 0
BACK PAIN: 0
COLOR CHANGE: 0

## 2023-01-18 NOTE — ED ATTESTATION NOTE
The patient was evaluated and managed by the physician assistant / nurse practitioner.       Fidel Huynh MD  01/17/23 5420

## 2023-01-18 NOTE — ED PROVIDER NOTES
"Emergency Medicine Note  HPI   HISTORY OF PRESENT ILLNESS     61 y/o female with no significant PMHx who presents s/p head injury earlier today. States she tripped in her kitchen and hit the L side of her head. Denies LOC, blood thinners, nausea, vomiting, vision changes, or paresthesias. Notes a headache earlier that has improved greatly and now describes as a \"dull\" pain. States she was able to get up on her own and denies difficulty walking.            Patient History   PAST HISTORY     Reviewed from Nursing Triage:       Past Medical History:   Diagnosis Date   • Adjustment insomnia    • Anxiety in acute stress reaction    • COVID-19    • DDD (degenerative disc disease), lumbosacral    • Elevated blood pressure reading without diagnosis of hypertension    • Factor V Leiden mutation (CMS/HCC)    • Female stress incontinence    • Hypercholesterolemia    • Hyperglycemia    • Intermittent palpitations    • Mixed hyperlipidemia    • Chen's neuroma    • Nephrolithiasis    • Vitamin D deficiency        Past Surgical History:   Procedure Laterality Date   • BUNIONECTOMY     •  SECTION      x2       Family History   Problem Relation Age of Onset   • Multiple myeloma Biological Mother    • Diabetes Biological Mother    • Hypertension Biological Mother    • Hyperlipidemia Biological Mother    • Pancreatic cancer Biological Father    • Hypertension Biological Father    • Factor V Leiden deficiency Biological Father    • Anxiety disorder Biological Sister    • Alcohol abuse Biological Brother    • Heart disease Biological Brother    • Hyperlipidemia Biological Brother    • Drug abuse Biological Brother        Social History     Tobacco Use   • Smoking status: Never   • Smokeless tobacco: Never   Vaping Use   • Vaping Use: Never used   Substance Use Topics   • Alcohol use: Yes     Comment: rarely   • Drug use: Never         Review of Systems   REVIEW OF SYSTEMS     Review of Systems   Constitutional: Negative for " chills and fever.   Eyes: Negative for photophobia, pain and visual disturbance.   Gastrointestinal: Negative for abdominal pain, nausea and vomiting.   Musculoskeletal: Negative for back pain and neck pain.   Skin: Negative for color change and rash.   Neurological: Positive for headaches. Negative for dizziness, syncope, weakness and numbness.         VITALS     ED Vitals    Date/Time Temp Pulse Resp BP SpO2 [facility]   01/17/23 1812 36.4 °C (97.5 °F) 72 16 175/85 100 % JG                       Physical Exam   PHYSICAL EXAM     Physical Exam  Vitals and nursing note reviewed.   Constitutional:       General: She is not in acute distress.     Appearance: She is well-developed. She is not ill-appearing, toxic-appearing or diaphoretic.   HENT:      Head: Normocephalic and atraumatic.      Comments: Very slight <0.5cm abrasion R lateral forehead.     Nose: No congestion.   Eyes:      Extraocular Movements: Extraocular movements intact.      Conjunctiva/sclera: Conjunctivae normal.   Cardiovascular:      Rate and Rhythm: Normal rate and regular rhythm.   Pulmonary:      Effort: Pulmonary effort is normal. No respiratory distress.      Breath sounds: Normal breath sounds.   Abdominal:      Palpations: Abdomen is soft.      Tenderness: There is no abdominal tenderness.   Musculoskeletal:         General: No tenderness.      Right lower leg: No edema.      Left lower leg: No edema.   Skin:     General: Skin is warm and dry.   Neurological:      General: No focal deficit present.      Mental Status: She is alert and oriented to person, place, and time.      Sensory: No sensory deficit.      Coordination: Coordination normal.      Comments: Finger to nose normal. SILT UE and LE b/l.  strength 5/5 b/l.           PROCEDURES     Procedures     DATA     Results     None          Imaging Results          CT HEAD WITHOUT IV CONTRAST (Final result)  Result time 01/17/23 19:27:33    Final result                 Impression:     IMPRESSION:  1. No acute intracranial abnormality.             Narrative:    CLINICAL HISTORY: Fall. Head strike.    COMMENT:  TECHNIQUE: CT of the head was performed without intravenous contrast. Sagittal  and coronal reformations were rendered.  CT DOSE:  One or more dose reduction techniques (e.g. automated exposure  control, adjustment of the mA and/or kV according to patient size, use of  iterative reconstruction technique) utilized for this examination.  COMPARISON: None.    Findings:  No acute intracranial hemorrhage. No extra-axial fluid collection, midline shift  or mass effect.  No acute transcortical infarction.  Ventricles and sulci are normal in size. Cerebral volume is age-appropriate.    No focal osseous abnormality.  Visualized paranasal sinuses and mastoid air cells are essentially clear.                                No orders to display       Scoring tools                                  ED Course & MDM   MDM / ED COURSE / CLINICAL IMPRESSION / DISPO     Medical Decision Making  S/p mechanical fall, head strike. No alarm s/s. No focal neuro deficits. CTH without acute findings. Dispo- f/u with PMD/Concussion clinic prn.    Amount and/or Complexity of Data Reviewed  Radiology: ordered and independent interpretation performed.      Risk  OTC drugs.             Clinical Impression      Head injury, initial encounter     _________________     ED Disposition   Discharge                   Laurie Griffin PA C  01/17/23 2006

## 2023-06-09 ENCOUNTER — TRANSCRIBE ORDERS (OUTPATIENT)
Dept: SCHEDULING | Age: 63
End: 2023-06-09

## 2023-06-09 DIAGNOSIS — Z12.31 ENCOUNTER FOR SCREENING MAMMOGRAM FOR MALIGNANT NEOPLASM OF BREAST: Primary | ICD-10-CM

## 2023-09-11 ENCOUNTER — APPOINTMENT (OUTPATIENT)
Dept: URBAN - METROPOLITAN AREA CLINIC 203 | Age: 63
Setting detail: DERMATOLOGY
End: 2023-09-22

## 2023-09-11 DIAGNOSIS — Z41.9 ENCOUNTER FOR PROCEDURE FOR PURPOSES OTHER THAN REMEDYING HEALTH STATE, UNSPECIFIED: ICD-10-CM

## 2023-09-11 PROCEDURE — OTHER CHEMICAL PEEL GLYCOLIC ACID: OTHER

## 2023-09-11 ASSESSMENT — LOCATION ZONE DERM: LOCATION ZONE: FACE

## 2023-09-11 ASSESSMENT — LOCATION DETAILED DESCRIPTION DERM: LOCATION DETAILED: INFERIOR MID FOREHEAD

## 2023-09-11 ASSESSMENT — LOCATION SIMPLE DESCRIPTION DERM: LOCATION SIMPLE: INFERIOR FOREHEAD

## 2023-09-11 NOTE — PROCEDURE: CHEMICAL PEEL GLYCOLIC ACID
Post Peel Care: The peel was neutralized with sodium bicarbonate.  After the procedure, the treatment area was washed with soap and water, and a post-peel cream was applied. Sun protection and post-care instructions were reviewed with the patient.
Treatment Number: 1
Time (Mins): 2
Detail Level: Zone
Price (Use Numbers Only, No Special Characters Or $): 150
Glycolic Acid %: 35%
Post-Care Instructions: I reviewed with the patient in detail post-care instructions. Patient should avoid sun exposure and wear sun protection.
Prep: The treated area was degreased with pre-peel cleanser, and vaseline was applied for protection of mucous membranes.
Consent: Prior to the procedure, written consent was obtained and risks were reviewed, including but not limited to: redness, peeling, blistering, pigmentary change, scarring, infection, and pain.

## 2023-10-05 ENCOUNTER — HOSPITAL ENCOUNTER (OUTPATIENT)
Dept: RADIOLOGY | Age: 63
Discharge: HOME | End: 2023-10-05
Attending: OBSTETRICS & GYNECOLOGY
Payer: COMMERCIAL

## 2023-10-05 DIAGNOSIS — Z12.31 ENCOUNTER FOR SCREENING MAMMOGRAM FOR MALIGNANT NEOPLASM OF BREAST: ICD-10-CM

## 2023-10-05 PROCEDURE — 77063 BREAST TOMOSYNTHESIS BI: CPT

## 2023-11-24 ENCOUNTER — HOSPITAL ENCOUNTER (OUTPATIENT)
Facility: CLINIC | Age: 63
Discharge: HOME | End: 2023-11-24
Attending: INTERNAL MEDICINE
Payer: COMMERCIAL

## 2023-11-24 ENCOUNTER — APPOINTMENT (OUTPATIENT)
Dept: RADIOLOGY | Age: 63
End: 2023-11-24
Attending: INTERNAL MEDICINE
Payer: COMMERCIAL

## 2023-11-24 VITALS
SYSTOLIC BLOOD PRESSURE: 155 MMHG | TEMPERATURE: 98.2 F | OXYGEN SATURATION: 100 % | DIASTOLIC BLOOD PRESSURE: 80 MMHG | RESPIRATION RATE: 16 BRPM | HEART RATE: 78 BPM

## 2023-11-24 DIAGNOSIS — R05.8 COUGH PRESENT FOR GREATER THAN 3 WEEKS: Primary | ICD-10-CM

## 2023-11-24 PROCEDURE — S9083 URGENT CARE CENTER GLOBAL: HCPCS | Performed by: INTERNAL MEDICINE

## 2023-11-24 PROCEDURE — 99213 OFFICE O/P EST LOW 20 MIN: CPT | Performed by: INTERNAL MEDICINE

## 2023-11-24 PROCEDURE — 71046 X-RAY EXAM CHEST 2 VIEWS: CPT | Performed by: INTERNAL MEDICINE

## 2023-11-24 RX ORDER — PREDNISONE 5 MG/1
30 TABLET ORAL ONCE
Status: COMPLETED | OUTPATIENT
Start: 2023-11-24 | End: 2023-11-24

## 2023-11-24 RX ORDER — ALBUTEROL SULFATE 0.83 MG/ML
2.5 SOLUTION RESPIRATORY (INHALATION) EVERY 6 HOURS PRN
Status: DISCONTINUED | OUTPATIENT
Start: 2023-11-24 | End: 2023-11-24 | Stop reason: HOSPADM

## 2023-11-24 RX ORDER — METHYLPREDNISOLONE 4 MG/1
TABLET ORAL
Qty: 21 TABLET | Refills: 0 | Status: SHIPPED | OUTPATIENT
Start: 2023-11-24

## 2023-11-24 RX ORDER — AZITHROMYCIN 250 MG/1
250 TABLET, FILM COATED ORAL DAILY
Qty: 6 TABLET | Refills: 0 | Status: SHIPPED | OUTPATIENT
Start: 2023-11-24 | End: 2023-11-29

## 2023-11-24 RX ORDER — ALBUTEROL SULFATE 90 UG/1
2 INHALANT RESPIRATORY (INHALATION) EVERY 4 HOURS PRN
Qty: 1 EACH | Refills: 0 | Status: SHIPPED | OUTPATIENT
Start: 2023-11-24 | End: 2023-12-24

## 2023-11-24 RX ADMIN — PREDNISONE 30 MG: 5 TABLET ORAL at 16:57

## 2023-11-24 ASSESSMENT — ENCOUNTER SYMPTOMS
FEVER: 0
WHEEZING: 1
FATIGUE: 1
SHORTNESS OF BREATH: 1
COUGH: 1
HEADACHES: 1
FLU SYMPTOMS: 1
VOMITING: 0
SORE THROAT: 0

## 2023-11-24 NOTE — ED PROVIDER NOTES
"History  No chief complaint on file.    Malaise    Recent ST a month ago  Cough  Saw PCP.  \"You're lungs are fine\"    Watery left eye  Felt rattling in chest earlier today    NKDA  covid tests negative    MALAISE/ACHY/COUGH      History provided by:  Patient   used: No    Flu Symptoms  Presenting symptoms: cough, fatigue, headache and shortness of breath    Presenting symptoms: no fever, no sore throat and no vomiting    Presenting symptoms comment:  Malaise  Duration:  4 weeks  Chronicity:  New  Relieved by:  OTC medications (tessalon, mucolytics)  Exacerbated by: cold.      Past Medical History:   Diagnosis Date    Adjustment insomnia     Anxiety in acute stress reaction     COVID-19     DDD (degenerative disc disease), lumbosacral     Elevated blood pressure reading without diagnosis of hypertension     Factor V Leiden mutation (CMS/HCC)     Female stress incontinence     Hypercholesterolemia     Hyperglycemia     Intermittent palpitations     Mixed hyperlipidemia     Chen's neuroma     Nephrolithiasis     Vitamin D deficiency        Past Surgical History:   Procedure Laterality Date    BUNIONECTOMY       SECTION      x2       Family History   Problem Relation Age of Onset    Multiple myeloma Biological Mother     Diabetes Biological Mother     Hypertension Biological Mother     Hyperlipidemia Biological Mother     Pancreatic cancer Biological Father     Hypertension Biological Father     Factor V Leiden deficiency Biological Father     Anxiety disorder Biological Sister     Alcohol abuse Biological Brother     Heart disease Biological Brother     Hyperlipidemia Biological Brother     Drug abuse Biological Brother        Social History     Tobacco Use    Smoking status: Never    Smokeless tobacco: Never   Vaping Use    Vaping Use: Never used   Substance Use Topics    Alcohol use: Yes     Comment: rarely    Drug use: Never       Review of Systems "   Constitutional: Positive for fatigue. Negative for fever.   HENT: Negative for sore throat.    Respiratory: Positive for cough, shortness of breath and wheezing.    Cardiovascular: Negative for leg swelling.   Gastrointestinal: Negative for vomiting.   Neurological: Positive for headaches.       Physical Exam  ED Triage Vitals   Temp Pulse Resp BP SpO2   -- -- -- -- --      Temp src Heart Rate Source Patient Position BP Location FiO2 (%) (Set)   -- -- -- -- --       Physical Exam  Vitals reviewed.   Constitutional:       General: She is not in acute distress.     Appearance: She is not toxic-appearing.   HENT:      Right Ear: Tympanic membrane, ear canal and external ear normal. There is no impacted cerumen.      Left Ear: Tympanic membrane, ear canal and external ear normal. There is no impacted cerumen.      Mouth/Throat:      Mouth: Mucous membranes are moist.      Pharynx: No oropharyngeal exudate or posterior oropharyngeal erythema.   Eyes:      General:         Right eye: No discharge.         Left eye: No discharge.   Cardiovascular:      Rate and Rhythm: Normal rate and regular rhythm.      Heart sounds: No murmur heard.     No friction rub.   Pulmonary:      Breath sounds: Wheezing present.      Comments: There is an intermittent inspiratory wheeze at the right lung base.  Musculoskeletal:      Right lower leg: No edema.      Left lower leg: No edema.   Skin:     General: Skin is warm and dry.      Findings: No rash.   Neurological:      General: No focal deficit present.      Mental Status: She is alert and oriented to person, place, and time.      Gait: Gait normal.   Psychiatric:         Behavior: Behavior normal.           Procedures  Procedures    UC Course       Medical Decision Making  There is definitely some airway inflammation on the examination.  Chest x-ray was negative for acute cardiopulmonary disease.  We started albuterol and prednisone in the office.  She will start the Medrol Dosepak  tomorrow but start the antibiotic tonight.  Medications were sent electronically to the pharmacy.  Please see disposition for full care plan.                   To Black,   11/24/23 [id number]

## 2023-11-24 NOTE — DISCHARGE INSTRUCTIONS
I agree with you that there is some airway inflammation especially at the right lung base.  That said chest x-ray showed no evidence of acute cardiopulmonary disease which is good news.  Given the fact that the cough has been ongoing for more than 3 weeks I am treating you with a short course of antibiotics along with a bronchodilator.  Use the albuterol 2 puffs every 4 hours as needed for cough or wheezing.  Watching a short YouTube video on how to use the inhaler properly may be helpful as well.  Additionally I am putting you on a Medrol Dosepak.  In the office you were given prednisone 30 mg.  Please have something to eat in the next 30 to 60 minutes.  Start the Medrol Dosepak tomorrow and take as directed.  Follow-up with your primary care doctor as directed.  You can continue the cough capsules that you have been using and or Mucinex.

## 2024-01-10 ENCOUNTER — HOSPITAL ENCOUNTER (OUTPATIENT)
Dept: RADIOLOGY | Age: 64
Discharge: HOME | End: 2024-01-10
Attending: FAMILY MEDICINE
Payer: COMMERCIAL

## 2024-01-10 ENCOUNTER — TRANSCRIBE ORDERS (OUTPATIENT)
Dept: RADIOLOGY | Age: 64
End: 2024-01-10

## 2024-01-10 DIAGNOSIS — M54.50 LOW BACK PAIN, UNSPECIFIED: ICD-10-CM

## 2024-01-10 DIAGNOSIS — M47.816 SPONDYLOSIS WITHOUT MYELOPATHY OR RADICULOPATHY, LUMBAR REGION: Primary | ICD-10-CM

## 2024-01-10 PROCEDURE — 72100 X-RAY EXAM L-S SPINE 2/3 VWS: CPT

## 2024-01-17 ENCOUNTER — TELEPHONE (OUTPATIENT)
Dept: REGISTRATION | Age: 64
End: 2024-01-17
Payer: COMMERCIAL

## 2024-01-17 ENCOUNTER — TELEPHONE (OUTPATIENT)
Dept: PHYSICAL THERAPY | Age: 64
End: 2024-01-17
Payer: COMMERCIAL

## 2024-01-17 NOTE — TELEPHONE ENCOUNTER
Hello,    This message is to offer an earlier appointment today at 4pm. If you are interested please call [phone].    Thank you

## 2024-04-29 ENCOUNTER — TRANSCRIBE ORDERS (OUTPATIENT)
Dept: RADIOLOGY | Age: 64
End: 2024-04-29

## 2024-04-29 ENCOUNTER — HOSPITAL ENCOUNTER (OUTPATIENT)
Dept: RADIOLOGY | Age: 64
Discharge: HOME | End: 2024-04-29
Attending: FAMILY MEDICINE
Payer: COMMERCIAL

## 2024-04-29 DIAGNOSIS — M25.551 PAIN IN RIGHT HIP: ICD-10-CM

## 2024-04-29 DIAGNOSIS — M25.551 PAIN IN RIGHT HIP: Primary | ICD-10-CM

## 2024-04-29 PROCEDURE — 73564 X-RAY EXAM KNEE 4 OR MORE: CPT | Mod: RT

## 2024-07-15 ENCOUNTER — TRANSCRIBE ORDERS (OUTPATIENT)
Dept: SCHEDULING | Age: 64
End: 2024-07-15

## 2024-07-15 DIAGNOSIS — M25.551 PAIN IN RIGHT HIP: Primary | ICD-10-CM

## 2024-07-16 ENCOUNTER — HOSPITAL ENCOUNTER (OUTPATIENT)
Dept: RADIOLOGY | Age: 64
Discharge: HOME | End: 2024-07-16
Attending: FAMILY MEDICINE
Payer: COMMERCIAL

## 2024-07-16 ENCOUNTER — TRANSCRIBE ORDERS (OUTPATIENT)
Dept: RADIOLOGY | Age: 64
End: 2024-07-16

## 2024-07-16 DIAGNOSIS — M25.551 PAIN IN RIGHT HIP: ICD-10-CM

## 2024-07-16 DIAGNOSIS — M25.551 PAIN IN RIGHT HIP: Primary | ICD-10-CM

## 2024-07-16 PROCEDURE — 73502 X-RAY EXAM HIP UNI 2-3 VIEWS: CPT | Mod: RT

## 2024-10-08 ENCOUNTER — TRANSCRIBE ORDERS (OUTPATIENT)
Dept: SCHEDULING | Age: 64
End: 2024-10-08

## 2024-10-08 DIAGNOSIS — Z12.31 ENCOUNTER FOR SCREENING MAMMOGRAM FOR MALIGNANT NEOPLASM OF BREAST: Primary | ICD-10-CM

## 2024-10-15 ENCOUNTER — HOSPITAL ENCOUNTER (OUTPATIENT)
Dept: RADIOLOGY | Age: 64
Discharge: HOME | End: 2024-10-15
Attending: OBSTETRICS & GYNECOLOGY
Payer: MEDICARE

## 2024-10-15 DIAGNOSIS — Z12.31 ENCOUNTER FOR SCREENING MAMMOGRAM FOR MALIGNANT NEOPLASM OF BREAST: ICD-10-CM

## 2024-10-15 PROCEDURE — 77067 SCR MAMMO BI INCL CAD: CPT

## 2024-10-24 ENCOUNTER — TRANSCRIBE ORDERS (OUTPATIENT)
Dept: SCHEDULING | Age: 64
End: 2024-10-24

## 2024-10-24 DIAGNOSIS — R91.1 SOLITARY PULMONARY NODULE: Primary | ICD-10-CM

## 2024-10-24 DIAGNOSIS — E78.2 MIXED HYPERLIPIDEMIA: ICD-10-CM

## 2024-11-12 ENCOUNTER — HOSPITAL ENCOUNTER (OUTPATIENT)
Dept: RADIOLOGY | Facility: CLINIC | Age: 64
Discharge: HOME | End: 2024-11-12
Attending: STUDENT IN AN ORGANIZED HEALTH CARE EDUCATION/TRAINING PROGRAM
Payer: MEDICARE

## 2024-11-12 DIAGNOSIS — E78.2 MIXED HYPERLIPIDEMIA: ICD-10-CM

## 2024-11-12 PROCEDURE — 75571 CT HRT W/O DYE W/CA TEST: CPT

## 2024-11-20 ENCOUNTER — HOSPITAL ENCOUNTER (OUTPATIENT)
Dept: RADIOLOGY | Age: 64
Discharge: HOME | End: 2024-11-20
Attending: STUDENT IN AN ORGANIZED HEALTH CARE EDUCATION/TRAINING PROGRAM
Payer: MEDICARE

## 2024-11-20 DIAGNOSIS — R91.1 SOLITARY PULMONARY NODULE: ICD-10-CM

## 2024-11-20 PROCEDURE — 71250 CT THORAX DX C-: CPT

## 2025-02-17 ENCOUNTER — HOSPITAL ENCOUNTER (OUTPATIENT)
Dept: RADIOLOGY | Facility: CLINIC | Age: 65
Discharge: HOME | End: 2025-02-17
Attending: INTERNAL MEDICINE
Payer: MEDICARE

## 2025-02-17 VITALS — HEIGHT: 63 IN | BODY MASS INDEX: 21.26 KG/M2 | WEIGHT: 120 LBS

## 2025-02-17 DIAGNOSIS — R91.1 SOLITARY PULMONARY NODULE: ICD-10-CM

## 2025-02-17 RX ORDER — FLUDEOXYGLUCOSE F18 300 MCI/ML
8.98 INJECTION INTRAVENOUS ONCE
Status: COMPLETED | OUTPATIENT
Start: 2025-02-17 | End: 2025-02-17

## 2025-02-17 RX ADMIN — FLUDEOXYGLUCOSE F18 8.98 MILLICURIE: 300 INJECTION INTRAVENOUS at 12:34

## 2025-04-03 ENCOUNTER — TRANSCRIBE ORDERS (OUTPATIENT)
Dept: SCHEDULING | Age: 65
End: 2025-04-03

## 2025-04-03 DIAGNOSIS — J37.0 CHRONIC LARYNGITIS: Primary | ICD-10-CM

## 2025-04-09 ENCOUNTER — HOSPITAL ENCOUNTER (OUTPATIENT)
Dept: RADIOLOGY | Facility: HOSPITAL | Age: 65
Discharge: HOME | End: 2025-04-09
Attending: OTOLARYNGOLOGY
Payer: MEDICARE

## 2025-04-09 DIAGNOSIS — J37.0 CHRONIC LARYNGITIS: ICD-10-CM

## 2025-04-09 PROCEDURE — 70491 CT SOFT TISSUE NECK W/DYE: CPT

## 2025-04-09 PROCEDURE — 63600105 HC IODINE BASED CONTRAST: Mod: JW | Performed by: OTOLARYNGOLOGY

## 2025-04-09 RX ORDER — IOPAMIDOL 755 MG/ML
75 INJECTION, SOLUTION INTRAVASCULAR
Status: COMPLETED | OUTPATIENT
Start: 2025-04-09 | End: 2025-04-09

## 2025-04-09 RX ADMIN — IOPAMIDOL 75 ML: 755 INJECTION, SOLUTION INTRAVENOUS at 14:15

## 2025-04-09 NOTE — PROGRESS NOTES
"Soraya RELL Nella   [id number]    Your doctor has referred you for a CT SOFT TISSUE NECK W IV CONTRAST that requires the injection of an iodinated contrast material into your bloodstream. This iodinated contrast material, sometimes referred to as \"x-ray dye\" allows for better interpretation of the x-ray films or CT images and results in a more accurate interpretation of the examination.     Without the use of iodinated contrast (x-ray dye), the examination may be less informative and result in a suboptimal examination, and possibly a delay in diagnosis and, if needed, treatment.     The iodinated contrast material is given through a small needle or catheter placed into a vein, usually on the inside of the elbow, on the back of hand, or in a vein in the foot or lower leg.    The most common, though still rare, potential reaction to an intravenous contrast injection is an allergic-like reaction. Most allergic-like reactions are mild, though a small subset of people can have more severe reactions. Mild reactions include mild / scattered hives, itching, scratchy throat, sneezing and nasal congestion. More severe reactions include facial swelling, severe difficulty breathing, wheezing and anaphylactic shock. Those with a prior history allergic-like reaction to the same class of contrast media (such as CT contrast or MRI contrast) are at the highest risk for an allergic reaction.     If you believe you had an allergic reaction to contrast in the past, please let our staff know. We can determine if this increases your risk for a future reaction and provide steps to decrease the risk. This may delay your examination, but it decreases the risk of having a new and possibly more severe reaction to the contrast injection.    People with a history of prior allergic reactions to other substances (such as unrelated medications and food) and patients with a history of asthma have slightly increased risk for an allergic reaction from " contrast material when compared with the general population, but do not require to be pretreated prior to a contrast injection.    You should notify the physician, nurse or technologist if you have ever had any of these conditions or if you have any questions.

## 2025-07-15 ENCOUNTER — TELEPHONE (OUTPATIENT)
Dept: GENETICS | Facility: HOSPITAL | Age: 65
End: 2025-07-15
Payer: MEDICARE

## 2025-07-15 NOTE — TELEPHONE ENCOUNTER
7/15/25 I called Soraya Carmen regarding scheduling a cancer genetic counseling appointment per referral from Dr. Ferreira. I provided information about the program and scheduled her for 7/30 at 11 AM via telemedicine

## 2025-08-13 ENCOUNTER — TELEMEDICINE (OUTPATIENT)
Dept: GENETICS | Age: 65
End: 2025-08-13
Attending: INTERNAL MEDICINE
Payer: MEDICARE

## 2025-08-13 DIAGNOSIS — Z80.0 FHX: CANCER OF GI TRACT: ICD-10-CM

## 2025-08-13 DIAGNOSIS — Z71.83 ENCOUNTER FOR NONPROCREATIVE GENETIC COUNSELING: Primary | ICD-10-CM

## 2025-08-13 DIAGNOSIS — Z85.51 PERSONAL HISTORY OF MALIGNANT NEOPLASM OF BLADDER: ICD-10-CM

## 2025-08-13 PROCEDURE — 96041 GENETIC COUNSELING SVC EA 30: CPT | Mod: GY | Performed by: GENETIC COUNSELOR, MS
